# Patient Record
Sex: MALE | Race: BLACK OR AFRICAN AMERICAN | NOT HISPANIC OR LATINO | URBAN - METROPOLITAN AREA
[De-identification: names, ages, dates, MRNs, and addresses within clinical notes are randomized per-mention and may not be internally consistent; named-entity substitution may affect disease eponyms.]

---

## 2022-07-10 ENCOUNTER — EMERGENCY (EMERGENCY)
Facility: HOSPITAL | Age: 32
LOS: 1 days | Discharge: HOME | End: 2022-07-10
Attending: EMERGENCY MEDICINE | Admitting: EMERGENCY MEDICINE

## 2022-07-10 VITALS
SYSTOLIC BLOOD PRESSURE: 128 MMHG | WEIGHT: 192.9 LBS | DIASTOLIC BLOOD PRESSURE: 83 MMHG | OXYGEN SATURATION: 98 % | RESPIRATION RATE: 18 BRPM | HEART RATE: 105 BPM | HEIGHT: 68 IN | TEMPERATURE: 97 F

## 2022-07-10 VITALS — HEART RATE: 97 BPM

## 2022-07-10 PROCEDURE — 99284 EMERGENCY DEPT VISIT MOD MDM: CPT

## 2022-07-10 PROCEDURE — 93010 ELECTROCARDIOGRAM REPORT: CPT | Mod: NC

## 2022-07-10 NOTE — ED ADULT NURSE NOTE - OBJECTIVE STATEMENT
Pt came in c/o of palpitations x today after taking edible/smoking marijuana/xannax today. Denies cp/sob. A&Ox3 speaking in full sentences. NAD. EKG in progress

## 2022-07-10 NOTE — ED ADULT TRIAGE NOTE - CHIEF COMPLAINT QUOTE
Pt walks in c/o palpitations after taking an edible at 11am this morning. Pt also drank alcohol last night, smoked marijuana this morning, and took prescribed xanax today.  in triage. Pt denies CP and SOB.

## 2022-07-10 NOTE — ED PROVIDER NOTE - PATIENT PORTAL LINK FT
You can access the FollowMyHealth Patient Portal offered by [facility] by registering at the following website: [email or web address]. By joining [email or web address]’s FollowMyHealth portal, you will also be able to view your health information using other applications (apps) compatible with our system.

## 2022-07-10 NOTE — ED PROVIDER NOTE - OBJECTIVE STATEMENT
Nurse Triage Note:   Vital Signs: HR 97, /83, Resp 18, Temp(F) 97 F, SpO2 98% Nurse Triage Note:   Vital Signs: HR 97, /83, Resp 18, Temp(F) 97 F, SpO2 98%    32 yo male to the ER today with the complaint of palpitations s/p taking cannabis edibles today.  was at home today watching a movie - too edibles - slow progressive feeling of anxiousness / palpitations called ems  since transport patient states he feels "much better"  palpitations and anxiousness are "gone" Nurse Triage Note:   Vital Signs: HR 97, /83, Resp 18, Temp(F) 97 F, SpO2 98%  -  32 yo male to the ER today with the complaint of palpitations s/p taking cannabis edibles today.  was at home today watching a movie - ate  edibles - slow progressive feeling of anxiousness / palpitations called ems  since transport patient states he feels "much better"  palpitations and anxiousness are "gone"      no cp no sob no abd pain no n/v/d  no motor sensory deficits

## 2022-07-10 NOTE — ED PROVIDER NOTE - NSFOLLOWUPINSTRUCTIONS_ED_ALL_ED_FT
limit cannabis use    stay well hydrated     follow up with your doctor next week    return to ER if your symptoms worsen or for any other concern

## 2022-07-10 NOTE — ED PROVIDER NOTE - CLINICAL SUMMARY MEDICAL DECISION MAKING FREE TEXT BOX
The patient tolerated [facility]  ED evaluation and management discussed with the patient and family (if available) in detail.  Close PMD and/or specialist follow up encouraged.  Strict ED return instructions discussed in detail for any worsening or new symptoms. The patient was given the opportunity to ask any questions about their discharge diagnosis and discharge instructions. The patient verbalized understanding of these instructions and need to return to the ED for any worsening of illness or for any concern. The patient received a printed version of the discharge instructions. The patient understands the Emergency Department diagnosis is a preliminary diagnosis often based on limited information and that the patient must adhere to the follow-up plan as discussed.  At the time of discharge from the Emergency Department, the patient is alert with fluent appropriate speech and ambulatory without difficulty.  A medical screening examination was performed and no emergency medical condition was identified.

## 2022-07-11 DIAGNOSIS — F12.90 CANNABIS USE, UNSPECIFIED, UNCOMPLICATED: ICD-10-CM

## 2022-07-11 DIAGNOSIS — R00.2 PALPITATIONS: ICD-10-CM
